# Patient Record
Sex: MALE | Race: WHITE | NOT HISPANIC OR LATINO
[De-identification: names, ages, dates, MRNs, and addresses within clinical notes are randomized per-mention and may not be internally consistent; named-entity substitution may affect disease eponyms.]

---

## 2019-05-21 PROBLEM — Z00.00 ENCOUNTER FOR PREVENTIVE HEALTH EXAMINATION: Status: ACTIVE | Noted: 2019-05-21

## 2019-06-03 ENCOUNTER — FORM ENCOUNTER (OUTPATIENT)
Age: 74
End: 2019-06-03

## 2019-06-03 VITALS — HEIGHT: 69 IN | BODY MASS INDEX: 35.55 KG/M2 | WEIGHT: 240 LBS

## 2019-06-03 DIAGNOSIS — Z87.39 PERSONAL HISTORY OF OTHER DISEASES OF THE MUSCULOSKELETAL SYSTEM AND CONNECTIVE TISSUE: ICD-10-CM

## 2019-06-03 DIAGNOSIS — Z86.79 PERSONAL HISTORY OF OTHER DISEASES OF THE CIRCULATORY SYSTEM: ICD-10-CM

## 2019-06-03 DIAGNOSIS — Z80.3 FAMILY HISTORY OF MALIGNANT NEOPLASM OF BREAST: ICD-10-CM

## 2019-06-03 DIAGNOSIS — R59.0 LOCALIZED ENLARGED LYMPH NODES: ICD-10-CM

## 2019-06-03 DIAGNOSIS — Z86.39 PERSONAL HISTORY OF OTHER ENDOCRINE, NUTRITIONAL AND METABOLIC DISEASE: ICD-10-CM

## 2019-06-03 DIAGNOSIS — I10 ESSENTIAL (PRIMARY) HYPERTENSION: ICD-10-CM

## 2019-06-03 DIAGNOSIS — Z87.891 PERSONAL HISTORY OF NICOTINE DEPENDENCE: ICD-10-CM

## 2019-06-03 RX ORDER — ATENOLOL 50 MG/1
50 TABLET ORAL
Refills: 0 | Status: ACTIVE | COMMUNITY

## 2019-06-03 RX ORDER — UBIDECARENONE 30 MG
1200 CAPSULE ORAL
Refills: 0 | Status: ACTIVE | COMMUNITY

## 2019-06-03 RX ORDER — UBIDECARENONE 100 MG
100 CAPSULE ORAL
Refills: 0 | Status: ACTIVE | COMMUNITY

## 2019-06-03 RX ORDER — CLOPIDOGREL 75 MG/1
75 TABLET, FILM COATED ORAL
Refills: 0 | Status: ACTIVE | COMMUNITY

## 2019-06-03 RX ORDER — ASPIRIN ENTERIC COATED TABLETS 81 MG 81 MG/1
81 TABLET, DELAYED RELEASE ORAL
Refills: 0 | Status: ACTIVE | COMMUNITY

## 2019-06-03 RX ORDER — MULTIVITAMIN
TABLET ORAL
Refills: 0 | Status: ACTIVE | COMMUNITY

## 2019-06-03 RX ORDER — LIRAGLUTIDE 6 MG/ML
18 INJECTION SUBCUTANEOUS
Refills: 0 | Status: ACTIVE | COMMUNITY

## 2019-06-03 RX ORDER — OMEGA-3/DHA/EPA/FISH OIL 300-1000MG
CAPSULE ORAL
Refills: 0 | Status: ACTIVE | COMMUNITY

## 2019-06-03 RX ORDER — CANAGLIFLOZIN 100 MG/1
100 TABLET, FILM COATED ORAL
Refills: 0 | Status: ACTIVE | COMMUNITY

## 2019-06-03 RX ORDER — CHOLECALCIFEROL (VITAMIN D3) 50 MCG
2000 CAPSULE ORAL
Refills: 0 | Status: ACTIVE | COMMUNITY

## 2019-06-03 RX ORDER — LISINOPRIL 10 MG/1
10 TABLET ORAL
Refills: 0 | Status: ACTIVE | COMMUNITY

## 2019-06-03 RX ORDER — FEBUXOSTAT 40 MG/1
40 TABLET ORAL
Refills: 0 | Status: ACTIVE | COMMUNITY

## 2019-06-03 RX ORDER — METFORMIN HYDROCHLORIDE 500 MG/1
500 TABLET, COATED ORAL
Refills: 0 | Status: ACTIVE | COMMUNITY

## 2019-06-04 ENCOUNTER — OTHER (OUTPATIENT)
Age: 74
End: 2019-06-04

## 2019-06-04 ENCOUNTER — APPOINTMENT (OUTPATIENT)
Dept: THORACIC SURGERY | Facility: CLINIC | Age: 74
End: 2019-06-04

## 2019-06-04 ENCOUNTER — OUTPATIENT (OUTPATIENT)
Dept: OUTPATIENT SERVICES | Facility: HOSPITAL | Age: 74
LOS: 1 days | End: 2019-06-04
Payer: MEDICARE

## 2019-06-04 VITALS
DIASTOLIC BLOOD PRESSURE: 76 MMHG | TEMPERATURE: 98 F | SYSTOLIC BLOOD PRESSURE: 162 MMHG | HEART RATE: 61 BPM | RESPIRATION RATE: 20 BRPM | WEIGHT: 257.94 LBS | HEIGHT: 69 IN

## 2019-06-04 DIAGNOSIS — I65.22 OCCLUSION AND STENOSIS OF LEFT CAROTID ARTERY: ICD-10-CM

## 2019-06-04 DIAGNOSIS — I25.10 ATHEROSCLEROTIC HEART DISEASE OF NATIVE CORONARY ARTERY WITHOUT ANGINA PECTORIS: Chronic | ICD-10-CM

## 2019-06-04 DIAGNOSIS — E11.9 TYPE 2 DIABETES MELLITUS WITHOUT COMPLICATIONS: ICD-10-CM

## 2019-06-04 DIAGNOSIS — Z98.890 OTHER SPECIFIED POSTPROCEDURAL STATES: Chronic | ICD-10-CM

## 2019-06-04 DIAGNOSIS — I10 ESSENTIAL (PRIMARY) HYPERTENSION: ICD-10-CM

## 2019-06-04 DIAGNOSIS — R59.0 LOCALIZED ENLARGED LYMPH NODES: ICD-10-CM

## 2019-06-04 DIAGNOSIS — I25.10 ATHEROSCLEROTIC HEART DISEASE OF NATIVE CORONARY ARTERY WITHOUT ANGINA PECTORIS: ICD-10-CM

## 2019-06-04 DIAGNOSIS — Z90.89 ACQUIRED ABSENCE OF OTHER ORGANS: Chronic | ICD-10-CM

## 2019-06-04 DIAGNOSIS — Z29.9 ENCOUNTER FOR PROPHYLACTIC MEASURES, UNSPECIFIED: ICD-10-CM

## 2019-06-04 DIAGNOSIS — I65.29 OCCLUSION AND STENOSIS OF UNSPECIFIED CAROTID ARTERY: ICD-10-CM

## 2019-06-04 DIAGNOSIS — Z01.818 ENCOUNTER FOR OTHER PREPROCEDURAL EXAMINATION: ICD-10-CM

## 2019-06-04 DIAGNOSIS — N40.0 BENIGN PROSTATIC HYPERPLASIA WITHOUT LOWER URINARY TRACT SYMPTOMS: ICD-10-CM

## 2019-06-04 DIAGNOSIS — M10.9 GOUT, UNSPECIFIED: ICD-10-CM

## 2019-06-04 LAB
ANION GAP SERPL CALC-SCNC: 11 MMOL/L — SIGNIFICANT CHANGE UP (ref 5–17)
BASOPHILS # BLD AUTO: 0 K/UL — SIGNIFICANT CHANGE UP (ref 0–0.2)
BASOPHILS NFR BLD AUTO: 0.5 % — SIGNIFICANT CHANGE UP (ref 0–2)
BUN SERPL-MCNC: 23 MG/DL — HIGH (ref 8–20)
CALCIUM SERPL-MCNC: 10.9 MG/DL — HIGH (ref 8.6–10.2)
CHLORIDE SERPL-SCNC: 103 MMOL/L — SIGNIFICANT CHANGE UP (ref 98–107)
CO2 SERPL-SCNC: 25 MMOL/L — SIGNIFICANT CHANGE UP (ref 22–29)
CREAT SERPL-MCNC: 1.01 MG/DL — SIGNIFICANT CHANGE UP (ref 0.5–1.3)
EOSINOPHIL # BLD AUTO: 0.4 K/UL — SIGNIFICANT CHANGE UP (ref 0–0.5)
EOSINOPHIL NFR BLD AUTO: 5.7 % — HIGH (ref 0–5)
GLUCOSE SERPL-MCNC: 127 MG/DL — HIGH (ref 70–115)
HBA1C BLD-MCNC: 6.7 % — HIGH (ref 4–5.6)
HCT VFR BLD CALC: 45.7 % — SIGNIFICANT CHANGE UP (ref 42–52)
HGB BLD-MCNC: 14.9 G/DL — SIGNIFICANT CHANGE UP (ref 14–18)
LYMPHOCYTES # BLD AUTO: 2.4 K/UL — SIGNIFICANT CHANGE UP (ref 1–4.8)
LYMPHOCYTES # BLD AUTO: 31.6 % — SIGNIFICANT CHANGE UP (ref 20–55)
MCHC RBC-ENTMCNC: 29.3 PG — SIGNIFICANT CHANGE UP (ref 27–31)
MCHC RBC-ENTMCNC: 32.6 G/DL — SIGNIFICANT CHANGE UP (ref 32–36)
MCV RBC AUTO: 90 FL — SIGNIFICANT CHANGE UP (ref 80–94)
MONOCYTES # BLD AUTO: 0.8 K/UL — SIGNIFICANT CHANGE UP (ref 0–0.8)
MONOCYTES NFR BLD AUTO: 10.6 % — HIGH (ref 3–10)
NEUTROPHILS # BLD AUTO: 3.9 K/UL — SIGNIFICANT CHANGE UP (ref 1.8–8)
NEUTROPHILS NFR BLD AUTO: 51.2 % — SIGNIFICANT CHANGE UP (ref 37–73)
PLATELET # BLD AUTO: 185 K/UL — SIGNIFICANT CHANGE UP (ref 150–400)
POTASSIUM SERPL-MCNC: 5.2 MMOL/L — SIGNIFICANT CHANGE UP (ref 3.5–5.3)
POTASSIUM SERPL-SCNC: 5.2 MMOL/L — SIGNIFICANT CHANGE UP (ref 3.5–5.3)
RBC # BLD: 5.08 M/UL — SIGNIFICANT CHANGE UP (ref 4.6–6.2)
RBC # FLD: 13.6 % — SIGNIFICANT CHANGE UP (ref 11–15.6)
SODIUM SERPL-SCNC: 139 MMOL/L — SIGNIFICANT CHANGE UP (ref 135–145)
WBC # BLD: 7.5 K/UL — SIGNIFICANT CHANGE UP (ref 4.8–10.8)
WBC # FLD AUTO: 7.5 K/UL — SIGNIFICANT CHANGE UP (ref 4.8–10.8)

## 2019-06-04 PROCEDURE — G0463: CPT

## 2019-06-04 PROCEDURE — 70498 CT ANGIOGRAPHY NECK: CPT | Mod: 26

## 2019-06-04 PROCEDURE — 93005 ELECTROCARDIOGRAM TRACING: CPT

## 2019-06-04 PROCEDURE — 70498 CT ANGIOGRAPHY NECK: CPT

## 2019-06-04 PROCEDURE — 85027 COMPLETE CBC AUTOMATED: CPT

## 2019-06-04 PROCEDURE — 93010 ELECTROCARDIOGRAM REPORT: CPT

## 2019-06-04 PROCEDURE — 36415 COLL VENOUS BLD VENIPUNCTURE: CPT

## 2019-06-04 PROCEDURE — 80048 BASIC METABOLIC PNL TOTAL CA: CPT

## 2019-06-04 PROCEDURE — 99205 OFFICE O/P NEW HI 60 MIN: CPT

## 2019-06-04 PROCEDURE — 83036 HEMOGLOBIN GLYCOSYLATED A1C: CPT

## 2019-06-04 RX ORDER — ATORVASTATIN CALCIUM 80 MG/1
1 TABLET, FILM COATED ORAL
Qty: 30 | Refills: 0
Start: 2019-06-04

## 2019-06-04 RX ORDER — LIRAGLUTIDE 6 MG/ML
0 INJECTION SUBCUTANEOUS
Qty: 0 | Refills: 0 | DISCHARGE

## 2019-06-04 NOTE — CONSULT NOTE ADULT - SUBJECTIVE AND OBJECTIVE BOX
PMD : From [place]   Cardio :  From [place]  Renard D[facility]     Patient is a 73y old  Male who presented today to presurgical testing for clearance for planned mediastinoscopy planned to be done on June 25th 2019 by Dr Blas . Patient was cleared by out of state ( his PMD / Cardio ) , called to medically clear patient for planned procedure as patient has  incidental findings of enlarged lymph nodes.     CC: Abnormal CT , incidental findings of lymphadenopathy   HPI:  This is a 73 year old male,   accompanied by his daughter ( Ms Christina Manzo ) with h/o HTN, DM type II, carotid stenosis - recently found on CT , CAD s/p 4 stents ( 2 stents done 2000 and last 2 done 2006)  , gout.  Patient state in the end of march, he had a episode of gross hematuria and urine retention.   He was sent to a urology had cystoscopy and then on march 29/19 had a  CT abd and pelvis revealed - right renal cyst, diffuse prostatomegaly, punctate nonobstructive right nephrolithiasis, no ureteral obstructing.  Superior mesenteric root stranding with lymphoadenopathy, the largest hawa mass approx 3.2 x4.7 cm .    5/1/19 CT chest -right paratracheal lymphadenopathy malignancy not excluded. Indeterminate 6 mm sclerotic lesion posterior right 11th rib. Mild cardiomegaly with coronary artery calcifications.  CT Neck -right paratracheal pathological enlarged lymph nodes. high grade proximal left ICA stenosis. 5/7/19 PET CT- increased uptake in mediastinal and abdominal adenopathy with SUVs in the 5-6 range consistent with neoplasm. This is likely lymphoma given the distribution. none of these lesions is amenable to image guided percutaneous biopsy.   Patient state hematuria and urinary retention have since resolved. Denies SOB, CP, fatigue or abd pain . He is now schedule for flexible bronchoscopy, mediastinoscopy. (04 Jun 2019 13:21)  CT - chest / neck / abdomen - reports brought by daughter - reviewed by me .       PAST MEDICAL & SURGICAL HISTORY:  Carotid stenosis  Kidney stones  CAD, multiple vessel  Enlarged prostate  Hypertension  Diabetes type 2   Gout  S/P Mohs surgery for basal cell carcinoma: nose and forehead  H/O right wrist surgery: ex fix right wrist  H/O cystoscopy  CAD S/P percutaneous coronary angioplasty: 4 stents, 2000, 2006  S/P tonsillectomy      Social History:  Tobacco - ex smoker , smoked 2 PPD for about 30 yrs , quit 15 yrs ago   ETOH - ex heavy alcohol user , quit 30 yrs ago   Illicit drug abuse - denies   , retired     FAMILY HISTORY:  FH: heart disease (Sibling)  FHx: colon cancer (Sibling)  FH: breast cancer (Mother)  FHx: hypertension (Sibling, Mother)  FHx: diabetes mellitus      Allergies    penicillins (Anaphylaxis)    Intolerances    HOME MEDICATIONS :     · 	Invokana 100 mg oral tablet: Last Dose Taken:  , 1 tab(s) orally once a day  · 	metFORMIN 1000 mg oral tablet: Last Dose Taken:  , 1 tab(s) orally 2 times a day  · 	Plavix 75 mg oral tablet: Last Dose Taken:  , 1 tab(s) orally once a day  · 	Uloric 40 mg oral tablet: Last Dose Taken:  , 1 tab(s) orally once a day  · 	atenolol 50 mg oral tablet: Last Dose Taken:  , 1 tab(s) orally once a day  · 	lisinopril 10 mg oral tablet: Last Dose Taken:  , 1 tab(s) orally once a day  · 	Flomax 0.4 mg oral capsule: Last Dose Taken:  , 1 cap(s) orally once a day  · 	Flavio Low Dose 81 mg oral delayed release tablet: Last Dose Taken:  , 1 tab(s) orally once a day  · 	Victoza 18 mg/3 mL subcutaneous solution: 1.8  subcutaneous once a day    REVIEW OF SYSTEMS:  All systems are reviewed and are negative   MEDICATIONS  (STANDING):    MEDICATIONS  (PRN):      Vital Signs Last 24 Hrs  T(C): 36.8 (04 Jun 2019 13:21), Max: 36.8 (04 Jun 2019 13:21)  T(F): 98.3 (04 Jun 2019 13:21), Max: 98.3 (04 Jun 2019 13:21)  HR: 61 (04 Jun 2019 13:21) (61 - 61)  BP: 162/76 (04 Jun 2019 13:21) (162/76 - 162/76)  BP(mean): 104 (04 Jun 2019 13:21) (104 - 104)  RR: 20 (04 Jun 2019 13:21) (20 - 20)  SpO2: --    PHYSICAL EXAM:    GENERAL: NAD, well-groomed, obese   HEAD:  Atraumatic, Normocephalic  EYES: EOMI, PERRLA, conjunctiva and sclera clear  NECK: Supple, No JVD, Normal thyroid  NERVOUS SYSTEM:  Alert & Oriented X3, Good concentration; Motor Strength 5/5 B/L upper and lower extremities; DTRs 2+ intact and symmetric  CHEST/LUNG: CTA  b/l,  no rales, rhonchi, wheezing, or rubs  HEART: Regular rate and rhythm; No murmurs, rubs, or gallops  ABDOMEN: Soft, Nontender, Nondistended; Bowel sounds present, obese , ventral hernia +   EXTREMITIES:  2+ Peripheral Pulses, No clubbing, cyanosis, or edema ,   LYMPH: No lymphadenopathy noted  SKIN: No rashes or lesions    LABS:                        14.9   7.5   )-----------( 185      ( 04 Jun 2019 13:40 )             45.7     06-04    139  |  103  |  23.0<H>  ----------------------------<  127<H>  5.2   |  25.0  |  1.01    Ca    10.9<H>      04 Jun 2019 13:40    Hemoglobin A1C, Whole Blood (06.04.19 @ 13:40)    Hemoglobin A1C, Whole Blood: 6.7 %        < from: 12 Lead ECG (06.04.19 @ 13:27) >    Ventricular Rate 62 BPM    Atrial Rate 62 BPM    P-R Interval 172 ms    QRS Duration 94 ms    Q-T Interval 406 ms    QTC Calculation(Bezet) 412 ms    P Axis -7 degrees    R Axis -45 degrees    T Axis 0 degrees    Diagnosis Line Normal sinus rhythm  Left anterior fascicular block  Abnormal ECG    Confirmed by Kennedy Nova ([id number]) on 6/4/2019 4:38:58 PM    < end of copied text >    EKG ( 3/16/17 ) Sinus rhythm with L AFB   RADIOLOGY & ADDITIONAL STUDIES: PMD : From [place]   Cardio :  From [place]  [facility]     Patient is a 73y old  Male who presented today to presurgical testing for clearance for planned mediastinoscopy planned to be done on June 25th 2019 by Dr Blas . Patient was cleared by out of state ( his PMD / Cardio ) , called to medically clear patient for planned procedure as patient has  incidental findings of enlarged lymph nodes.     CC: Abnormal CT , incidental findings of lymphadenopathy   HPI:  This is a 73 year old male,   accompanied by his daughter ( Ms Christina Manzo ) with h/o HTN, DM type II, carotid stenosis - recently found on CT , CAD s/p 4 stents ( 2 stents done 2000 and last 2 done 2006)  , gout.  Patient state in the end of march, he had a episode of gross hematuria and urine retention.   He was sent to a urology had cystoscopy and then on march 29/19 had a  CT abd and pelvis revealed - right renal cyst, diffuse prostatomegaly, punctate nonobstructive right nephrolithiasis, no ureteral obstructing.  Superior mesenteric root stranding with lymphoadenopathy, the largest hawa mass approx 3.2 x4.7 cm .    5/1/19 CT chest -right paratracheal lymphadenopathy malignancy not excluded. Indeterminate 6 mm sclerotic lesion posterior right 11th rib. Mild cardiomegaly with coronary artery calcifications.  CT Neck -right paratracheal pathological enlarged lymph nodes. high grade proximal left ICA stenosis. 5/7/19 PET CT- increased uptake in mediastinal and abdominal adenopathy with SUVs in the 5-6 range consistent with neoplasm. This is likely lymphoma given the distribution. none of these lesions is amenable to image guided percutaneous biopsy.   Patient state hematuria and urinary retention have since resolved. Denies SOB, CP, fatigue or abd pain . He is now schedule for flexible bronchoscopy, mediastinoscopy. (04 Jun 2019 13:21)  CT - chest / neck / abdomen - reports brought by daughter - reviewed by me .       PAST MEDICAL & SURGICAL HISTORY:  Carotid stenosis  Kidney stones  CAD, multiple vessel  Enlarged prostate  Hypertension  Diabetes type 2   Gout  S/P Mohs surgery for basal cell carcinoma: nose and forehead  H/O right wrist surgery: ex fix right wrist  H/O cystoscopy  CAD S/P percutaneous coronary angioplasty: 4 stents, 2000, 2006  S/P tonsillectomy      Social History:  Tobacco - ex smoker , smoked 2 PPD for about 30 yrs , quit 15 yrs ago   ETOH - ex heavy alcohol user , quit 30 yrs ago   Illicit drug abuse - denies   , retired     FAMILY HISTORY:  FH: heart disease (Sibling)  FHx: colon cancer (Sibling)  FH: breast cancer (Mother)  FHx: hypertension (Sibling, Mother)  FHx: diabetes mellitus      Allergies    penicillins (Anaphylaxis)    Intolerances    HOME MEDICATIONS :     · 	Invokana 100 mg oral tablet: Last Dose Taken:  , 1 tab(s) orally once a day  · 	metFORMIN 1000 mg oral tablet: Last Dose Taken:  , 1 tab(s) orally 2 times a day  · 	Plavix 75 mg oral tablet: Last Dose Taken:  , 1 tab(s) orally once a day  · 	Uloric 40 mg oral tablet: Last Dose Taken:  , 1 tab(s) orally once a day  · 	atenolol 50 mg oral tablet: Last Dose Taken:  , 1 tab(s) orally once a day  · 	lisinopril 10 mg oral tablet: Last Dose Taken:  , 1 tab(s) orally once a day  · 	Flomax 0.4 mg oral capsule: Last Dose Taken:  , 1 cap(s) orally once a day  · 	Flavio Low Dose 81 mg oral delayed release tablet: Last Dose Taken:  , 1 tab(s) orally once a day  · 	Victoza 18 mg/3 mL subcutaneous solution: 1.8  subcutaneous once a day    REVIEW OF SYSTEMS:  All systems are reviewed and are negative   MEDICATIONS  (STANDING):    MEDICATIONS  (PRN):      Vital Signs Last 24 Hrs  T(C): 36.8 (04 Jun 2019 13:21), Max: 36.8 (04 Jun 2019 13:21)  T(F): 98.3 (04 Jun 2019 13:21), Max: 98.3 (04 Jun 2019 13:21)  HR: 61 (04 Jun 2019 13:21) (61 - 61)  BP: 162/76 (04 Jun 2019 13:21) (162/76 - 162/76)  BP(mean): 104 (04 Jun 2019 13:21) (104 - 104)  RR: 20 (04 Jun 2019 13:21) (20 - 20)  SpO2: --    PHYSICAL EXAM:    GENERAL: NAD, well-groomed, obese   HEAD:  Atraumatic, Normocephalic  EYES: EOMI, PERRLA, conjunctiva and sclera clear  NECK: Supple, No JVD, Normal thyroid  NERVOUS SYSTEM:  Alert & Oriented X3, Good concentration; Motor Strength 5/5 B/L upper and lower extremities; DTRs 2+ intact and symmetric  CHEST/LUNG: CTA  b/l,  no rales, rhonchi, wheezing, or rubs  HEART: Regular rate and rhythm; No murmurs, rubs, or gallops  ABDOMEN: Soft, Nontender, Nondistended; Bowel sounds present, obese , ventral hernia +   EXTREMITIES:  2+ Peripheral Pulses, No clubbing, cyanosis, or edema ,   LYMPH: No lymphadenopathy noted  SKIN: No rashes or lesions    LABS:                        14.9   7.5   )-----------( 185      ( 04 Jun 2019 13:40 )             45.7     06-04    139  |  103  |  23.0<H>  ----------------------------<  127<H>  5.2   |  25.0  |  1.01    Ca    10.9<H>      04 Jun 2019 13:40    Hemoglobin A1C, Whole Blood (06.04.19 @ 13:40)    Hemoglobin A1C, Whole Blood: 6.7 %        < from: 12 Lead ECG (06.04.19 @ 13:27) >    Ventricular Rate 62 BPM    Atrial Rate 62 BPM    P-R Interval 172 ms    QRS Duration 94 ms    Q-T Interval 406 ms    QTC Calculation(Bezet) 412 ms    P Axis -7 degrees    R Axis -45 degrees    T Axis 0 degrees    Diagnosis Line Normal sinus rhythm  Left anterior fascicular block  Abnormal ECG    Confirmed by Kennedy Nova ([id number]) on 6/4/2019 4:38:58 PM    < end of copied text >    EKG ( 3/16/17 ) Sinus rhythm with L AFB   RADIOLOGY & ADDITIONAL STUDIES:    < from: CT Angio Neck w/ IV Cont (06.04.19 @ 16:30) >   EXAM:  CT ANGIO NECK (W)AW IC                          PROCEDURE DATE:  06/04/2019          INTERPRETATION:      REASON FOR EXAM:   73-year-old man assess for carotid stenosis/occlusion    TECHNIQUE: Following administration of 95 mL of Omnipaque 350 contrast   material, CT angiography of the neck was performed with multiplanar, MIP   and 3D reformations.    COMPARISON: comp.      FINDINGS:  There is scattered calcification of aortic arch and at the origin of the   great vessels without hemodynamically significant stenosis.     Evaluation of the right carotid artery demonstrate a tortuous right   common carotid artery. There are calcified and noncalcified plaque at the   origin of the right internal carotid artery resulting in focal   nonhemodynamically significant narrowing at its origin however, in the   proximal right cervical internal carotid artery, there is soft and   calcified plaque resulting in focal high-grade stenosis of approximately   70-75 % .  The right cervical internalcarotid artery is tortuous but   otherwise unremarkable. The visualized intracranial right internal   carotid artery is intact. The right external carotid arteries within   normal limits. There is scattered atheromatous changes of the visualized   intracranial portion of right internal carotid artery.    Evaluation of the left carotid system demonstrates slightly tortuous left   common carotid artery. There is scattered calcified and noncalcified   plaque at the origin of the left internal carotid artery resulting in   stenosis of 55-65%. There is tortuous but patent left cervical internal   carotid artery without significant stenosis. There is normal left   external carotid artery.    There is normal opacification and caliber of the vertebral arteries are   patent.    The thyroid gland is slightly heterogenous. There are no gross masses in   the neck. Brain parenchyma is unremarkable.      IMPRESSION:    Focal calcified and noncalcified atheromatous plaque approximately 1 cm   above the right common carotid artery bifurcation at the proximal right   cervical internal carotid artery resulting in focal stenosis of 70-75%.    Atheromatous changes origin left internal carotid artery with narrowing   of 55-65%        GABRIELLA SHETTY M.D., ATTENDING RADIOLOGIST  This document has been electronically signed. Jun 4 2019  4:47PM        < end of copied text >

## 2019-06-04 NOTE — CONSULT NOTE ADULT - PROBLEM SELECTOR RECOMMENDATION 3
- recent cardiology w/u done , seen by Dr Freitas today and cleared , as per cardio hold Plavix for 5 days prior OR , continue ASA.   Stopped statins in the past , Lipitor 20 mg daily recommended .

## 2019-06-04 NOTE — CONSULT NOTE ADULT - ASSESSMENT
This is a 73 year old male  accompanied by his daughter ( Ms Christina Manzo ) with h/o HTN, DM type II, carotid stenosis - recently found on CT , CAD s/p 4 stents ( 2 stents done 2000 and last 2 done 2006 , gout.  Patient state in the end of march, he had a episode of gross hematuria and urine retention.   He was sent to a urology had cystoscopy and then on march 29/19 had a  CT abd and pelvis revealed - right renal cyst, diffuse prostatomegaly, punctate nonobstructive right nephrolithiasis, no ureteral obstructing.  Superior mesenteric root stranding with lymphoadenopathy, the largest hawa mass approx 3.2 x4.7 cm .    5/1/19 CT chest -right paratracheal lymphadenopathy malignancy not excluded. Indeterminate 6 mm sclerotic lesion posterior right 11th rib. Mild cardiomegaly with coronary artery calcifications.  CT Neck -right paratracheal pathological enlarged lymph nodes. high grade proximal left ICA stenosis. 5/7/19 PET CT- increased uptake in mediastinal and abdominal adenopathy with SUVs in the 5-6 range consistent with neoplasm. This is likely lymphoma given the distribution. none of these lesions is amenable to image guided percutaneous biopsy.   Patient state hematuria and urinary retention have since resolved. Denies SOB, CP, fatigue or abd pain . He is now schedule for flexible bronchoscopy, mediastinoscopy. (04 Jun 2019 13:21)    LVEF post stress is 55%. He has mild mitral and tricuspid valve regurgitation.  1. We will obtain CTA of neck for left carotid stenosis  2. Can hold plavix for 5 days prior to surgery  3. Continue with aspirin as before  4. His BP has been normal at home; high today. I will not change his meds. He will keep tract of BP readings at home  5. Start lipitor 20 mg daily due to CAD.  6. As long as he does not have severe aortic stenosis, he may proceed with surgery from our stand point. This is a 73 year old male  accompanied by his daughter ( Ms Christina Manzo ) with h/o HTN, DM type II, carotid stenosis - recently found on CT , CAD s/p 4 stents ( 2 stents done 2000 and last 2 done 2006 , gout.  Patient state in the end of march, he had a episode of gross hematuria and urine retention.   He was sent to a urology had cystoscopy and then on march 29/19 had a  CT abd and pelvis revealed - right renal cyst, diffuse prostatomegaly, punctate nonobstructive right nephrolithiasis, no ureteral obstructing.  Superior mesenteric root stranding with lymphoadenopathy, the largest hawa mass approx 3.2 x4.7 cm .    5/1/19 CT chest -right paratracheal lymphadenopathy malignancy not excluded. Indeterminate 6 mm sclerotic lesion posterior right 11th rib. Mild cardiomegaly with coronary artery calcifications.  CT Neck -right paratracheal pathological enlarged lymph nodes. high grade proximal left ICA stenosis. 5/7/19 PET CT- increased uptake in mediastinal and abdominal adenopathy with SUVs in the 5-6 range consistent with neoplasm. This is likely lymphoma given the distribution. none of these lesions is amenable to image guided percutaneous biopsy.   Patient state hematuria and urinary retention have since resolved. Denies SOB, CP, fatigue or abd pain . He is now schedule for flexible bronchoscopy, mediastinoscopy. (04 Jun 2019 13:21)

## 2019-06-04 NOTE — CONSULT NOTE ADULT - SUBJECTIVE AND OBJECTIVE BOX
Patient is a 73y old  Male who presents with a chief complaint of abnormal CT findings.    HPI:  Patient lives in [place]. He is here to have lymph node biopsy by Dr. Blas.   He has a history of hypertension, hyperlipidemia and diabetes. History of CAD with 4 stents last of which was in 2006. (7/13/2000 with Taxus to Lcx and 7/2006 with PCI to RCA).  He has had stress tests by his cardiologist, last one was last week and reportedly normal.  He walks daily and does house work. He has no chest pain or dyspnea with exertion. he denies any history of MI or CVA, CHF or renal insufficiency.   He has gross hematuria and urine retention started end of march. CT abd and pelvis revealed - punctate nonobstructive right nephrolithiasis, enlarged mesenteric lymph nodes noted on abd pelvis CT, enlarged mediastinal lymph nodes   5/1/19 CT chest -right paratracheal lymphadenopathy malignancy not excluded. Indeterminate 6 mm sclerotic lesion posterior right 11th rib. Mild cardiomegaly with coronary artery calcifications.  CT Neck right paratracheal pathological enlarged lymph nodes,  high grade proximal left ICA stenosis   5/1/19 superior mesenteric root lymphadenopathy largest hawa mass 3.2 x 4.7 cm.    PAST MEDICAL & SURGICAL HISTORY:  Carotid stenosis  Kidney stones  CAD, multiple vessel  Enlarged prostate  Hypertension  Diabetes  Gout  H/O cystoscopy  CAD S/P percutaneous coronary angioplasty: 4 stents, 2000, 2006  S/P tonsillectomy    Allergies  penicillins (Anaphylaxis)    MEDICATIONS  (STANDING):  INvokana 100 mg daily  Metformin 1000 bid  plavix 75 mg daily  Uloric 40 mg   Atenolol 50 mg  Lisinopril 10 mg daily  Victoza 1.8 sq daily  Flomax 0.4 mg daily  MEDICATIONS  (PRN):    FAMILY HISTORY:  FHx: colon cancer (Sibling)  FH: breast cancer (Mother)  FHx: hypertension (Sibling, Mother)  FHx: diabetes mellitus  FHx: lung cancer (Mother)  FHx: bladder cancer (Sibling)      SOCIAL HISTORY: Former smoker, quit 15 years ago. no ETOH or drug use.     REVIEW OF SYSTEMS:  CONSTITUTIONAL: No fever, weight loss, or fatigue  EYES: No eye pain, visual disturbances, or discharge  ENMT:  No difficulty hearing, tinnitus, vertigo; No sinus or throat pain  NECK: No pain or stiffness  RESPIRATORY: No cough, wheezing, chills or hemoptysis; No Shortness of Breath  CARDIOVASCULAR: No chest pain, palpitations, passing out, dizziness, or leg swelling  GASTROINTESTINAL: No abdominal or epigastric pain. No nausea, vomiting, or hematemesis; No diarrhea or constipation. No melena or hematochezia.  GENITOURINARY: See HPI  NEUROLOGICAL: No headaches, memory loss, loss of strength, numbness, or tremors  SKIN: No itching, burning, rashes, or lesions   LYMPH Nodes: No enlarged glands  ENDOCRINE: No heat or cold intolerance; No hair loss  MUSCULOSKELETAL: No joint pain or swelling; No muscle, back, or extremity pain  PSYCHIATRIC: No depression, anxiety, mood swings, or difficulty sleeping  HEME/LYMPH: No easy bruising, or bleeding gums  ALLERY AND IMMUNOLOGIC: No hives or eczema	    Vital Signs Last 24 Hrs  /90  Hr 62  RR 18  Pox 96% RA  No pain  Afebrile   PHYSICAL EXAM:  Appearance: Normal, well nourished	  HEENT:   Normal oral mucosa, PERRL, EOMI, sclera non-icteric	  Cardiovascular: Normal S1 S2, No JVD, 3/6 SM lsb, left carotid bruit   Respiratory: Lungs clear to auscultation	  Psychiatry: A & O x 3, Mood & affect appropriate  Gastrointestinal:  Soft, Non-tender, + BS, no bruits	  Skin: No rashes, No ecchymoses, No cyanosis  Neurologic: Grossly non-focal with full strength in all four extremities  Extremities: Normal range of motion, No clubbing, cyanosis or edema  Vascular: Peripheral pulses palpable 2+ bilaterally    ECG: NSR, 62 BPM, LAFB.     LABS:                        14.9   7.5   )-----------( 185      ( 04 Jun 2019 13:40 )             45.7     06-04    139  |  103  |  23.0<H>  ----------------------------<  127<H>  5.2   |  25.0  |  1.01    Ca    10.9<H>      04 Jun 2019 13:40

## 2019-06-04 NOTE — H&P PST ADULT - NSANTHOSAYNRD_GEN_A_CORE
No. KRISTAN screening performed.  STOP BANG Legend: 0-2 = LOW Risk; 3-4 = INTERMEDIATE Risk; 5-8 = HIGH Risk

## 2019-06-04 NOTE — H&P PST ADULT - NEGATIVE CARDIOVASCULAR SYMPTOMS
no chest pain/no paroxysmal nocturnal dyspnea/no dyspnea on exertion/no orthopnea/no palpitations/no claudication

## 2019-06-04 NOTE — H&P PST ADULT - NSICDXPASTSURGICALHX_GEN_ALL_CORE_FT
PAST SURGICAL HISTORY:  CAD S/P percutaneous coronary angioplasty 4 stents, 2000, 2006    H/O cystoscopy     S/P tonsillectomy PAST SURGICAL HISTORY:  CAD S/P percutaneous coronary angioplasty 4 stents, 2000, 2006    H/O cystoscopy     H/O right wrist surgery ex fix right wrist    S/P Mohs surgery for basal cell carcinoma nose and forehead    S/P tonsillectomy

## 2019-06-04 NOTE — H&P PST ADULT - ASSESSMENT
73 year old male with h/o HTN, DM type II, carotid stenosis, CAD s/p 4 stents, gout and enlarge prostate present to PST with c/o enlarged lymph nodes noted on CT.  He is now schedule for flexible bronchoscopy, mediastinoscopy.   CAPRINI VTE 2.0 SCORE [CLOT updated 2019]    AGE RELATED RISK FACTORS                                                       MOBILITY RELATED FACTORS  [ ] Age 41-60 years                                            (1 Point)                    [ ] Bed rest                                                        (1 Point)  [x ] Age: 61-74 years                                           (2 Points)                  [ ] Plaster cast                                                   (2 Points)  [ ] Age= 75 years                                              (3 Points)                    [ ] Bed bound for more than 72 hours                 (2 Points)    DISEASE RELATED RISK FACTORS                                               GENDER SPECIFIC FACTORS  [x ] Edema in the lower extremities                       (1 Point)              [ ] Pregnancy                                                     (1 Point)  [x ] Varicose veins                                               (1 Point)                     [ ] Post-partum < 6 weeks                                   (1 Point)             [ x] BMI > 25 Kg/m2                                            (1 Point)                     [ ] Hormonal therapy  or oral contraception          (1 Point)                 [ ] Sepsis (in the previous month)                        (1 Point)               [ ] History of pregnancy complications                 (1 point)  [ ] Pneumonia or serious lung disease                                               [ ] Unexplained or recurrent                     (1 Point)           (in the previous month)                               (1 Point)  [ ] Abnormal pulmonary function test                     (1 Point)                 SURGERY RELATED RISK FACTORS  [ ] Acute myocardial infarction                              (1 Point)               [ ]  Section                                             (1 Point)  [ ] Congestive heart failure (in the previous month)  (1 Point)      [ x] Minor surgery                                                  (1 Point)   [ ] Inflammatory bowel disease                             (1 Point)               [ ] Arthroscopic surgery                                        (2 Points)  [ ] Central venous access                                      (2 Points)                [ ] General surgery lasting more than 45 minutes (2 points)  [ ] Malignancy- Present or previous                   (2 Points)                [ ] Elective arthroplasty                                         (5 points)    [ ] Stroke (in the previous month)                          (5 Points)                                                                                                                                                           HEMATOLOGY RELATED FACTORS                                                 TRAUMA RELATED RISK FACTORS  [ ] Prior episodes of VTE                                     (3 Points)                [ ] Fracture of the hip, pelvis, or leg                       (5 Points)  [ ] Positive family history for VTE                         (3 Points)             [ ] Acute spinal cord injury (in the previous month)  (5 Points)  [ ] Prothrombin 54697 A                                     (3 Points)               [ ] Paralysis  (less than 1 month)                             (5 Points)  [ ] Factor V Leiden                                             (3 Points)                  [ ] Multiple Trauma within 1 month                        (5 Points)  [ ] Lupus anticoagulants                                     (3 Points)                                                           [ ] Anticardiolipin antibodies                               (3 Points)                                                       [ ] High homocysteine in the blood                      (3 Points)                                             [ ] Other congenital or acquired thrombophilia      (3 Points)                                                [ ] Heparin induced thrombocytopenia                  (3 Points)                                     Total Score [   6    ]

## 2019-06-04 NOTE — H&P PST ADULT - NSICDXFAMILYHX_GEN_ALL_CORE_FT
FAMILY HISTORY:  FHx: diabetes mellitus    Mother  Still living? Unknown  FH: breast cancer, Age at diagnosis: Age Unknown  FHx: hypertension, Age at diagnosis: Age Unknown  FHx: lung cancer, Age at diagnosis: Age Unknown    Sibling  Still living? Unknown  FHx: bladder cancer, Age at diagnosis: Age Unknown  FHx: colon cancer, Age at diagnosis: Age Unknown  FHx: hypertension, Age at diagnosis: Age Unknown FAMILY HISTORY:  FHx: diabetes mellitus    Mother  Still living? Unknown  FH: breast cancer, Age at diagnosis: Age Unknown  FHx: hypertension, Age at diagnosis: Age Unknown    Sibling  Still living? Unknown  FH: heart disease, Age at diagnosis: Age Unknown  FHx: colon cancer, Age at diagnosis: Age Unknown  FHx: hypertension, Age at diagnosis: Age Unknown

## 2019-06-04 NOTE — ASU PATIENT PROFILE, ADULT - PSH
CAD S/P percutaneous coronary angioplasty  4 stents, 2000, 2006  H/O cystoscopy    H/O right wrist surgery  ex fix right wrist  S/P Mohs surgery for basal cell carcinoma  nose and forehead  S/P tonsillectomy

## 2019-06-04 NOTE — CONSULT NOTE ADULT - ATTENDING COMMENTS
Patient is medically stable for planned procedure if cleared by cardiology . Cardiology noted , d/w Dt [place] - cleared by cardiology .   Patient is medically stable for planned procedure.

## 2019-06-04 NOTE — H&P PST ADULT - LAST ECHOCARDIOGRAM
unsure but think no more than 3 years ago 4/3/17  EF 55- 60 %, mild tricuspid regurgitation, left atrium chamber is mildly dilated

## 2019-06-04 NOTE — ASU PATIENT PROFILE, ADULT - PMH
CAD, multiple vessel    Carotid stenosis    Diabetes    Enlarged prostate    Gout    Hypertension    Kidney stones

## 2019-06-04 NOTE — CONSULT NOTE ADULT - PROBLEM SELECTOR RECOMMENDATION 6
moderate to high grade stenosis  .   CTA recommended CTA done - report noted - R carotid a .  with focal stenosis 70-75% ,  L carotid narrowing 55-65 %   Continue Lipitor 20 mg , if patient tolerates may consider to increase dose .  D/W daughter Christina - informed about plan .

## 2019-06-04 NOTE — H&P PST ADULT - NSICDXPROBLEM_GEN_ALL_CORE_FT
PROBLEM DIAGNOSES  Problem: Localized enlarged lymph nodes  Assessment and Plan: flexible Bronchoscopy, Mediastinoscopy PROBLEM DIAGNOSES  Problem: CAD, multiple vessel  Assessment and Plan: cardiac clearance  hold Plavix/ ASA  7 days prior to procedure      Problem: Hypertension  Assessment and Plan: routine labs and ekg  continue medication as directed  medical clearance     Problem: Diabetes  Assessment and Plan: routine labs   continue medication as directed  medical clearance     Problem: Carotid stenosis  Assessment and Plan: CTA neck order by cardiology     Problem: Need for prophylactic measure  Assessment and Plan: moderate to high risk for VTE event, the surgical team will evaluate for appropriate VTE prophylaxis     Problem: Localized enlarged lymph nodes  Assessment and Plan: flexible Bronchoscopy, Mediastinoscopy

## 2019-06-04 NOTE — H&P PST ADULT - NSICDXPASTMEDICALHX_GEN_ALL_CORE_FT
PAST MEDICAL HISTORY:  CAD, multiple vessel     Carotid stenosis     Diabetes     Enlarged prostate     Gout     Hypertension     Kidney stones

## 2019-06-04 NOTE — CONSULT NOTE ADULT - ASSESSMENT
72 yo male with prior CAD, stents to RCA and Lcx, HTN, hyperlipidemia and diabetes with incidental finding of CT abdomen and pelvis due to hematuria. He will need biopsy of paratracheal lymph nodes.  He has stopped smoking 15 years ago. Stress test last week was negative for ischemia.  LVEF post stress is 55%. He has mild mitral and tricuspid valve regurgitation.  1. We will obtain CTA of neck for left carotid stenosis  2. Can hold plavix for 5 days prior to surgery  3. Continue with aspirin as before  4. His BP has been normal at home; high today. I will not change his meds. He will keep tract of BP readings at home  5. Start lipitor 20 mg daily due to CAD.  6. As long as he does not have severe aortic stenosis, he may proceed with surgery from our stand point. 72 yo male with prior CAD, stents to RCA and Lcx, HTN, hyperlipidemia and diabetes with incidental finding of CT abdomen and pelvis due to hematuria. He will need biopsy of paratracheal lymph nodes.  He has stopped smoking 15 years ago. Stress test last week was negative for ischemia.  LVEF post stress is 55%. He has mild mitral and tricuspid valve regurgitation.  1. We will obtain CTA of neck for left carotid stenosis  2. Can hold plavix for 5 days prior to surgery  3. Continue with aspirin as before  4. His BP has been normal at home; high today. I will not change his meds. He will keep tract of BP readings at home  5. Start lipitor 20 mg daily due to CAD.  6. As long as he does not have severe carotid stenosis, he may proceed with surgery from our stand point.

## 2019-06-07 RX ORDER — SODIUM CHLORIDE 9 MG/ML
3 INJECTION INTRAMUSCULAR; INTRAVENOUS; SUBCUTANEOUS ONCE
Refills: 0 | Status: DISCONTINUED | OUTPATIENT
Start: 2019-06-25 | End: 2019-06-25

## 2019-06-24 ENCOUNTER — TRANSCRIPTION ENCOUNTER (OUTPATIENT)
Age: 74
End: 2019-06-24

## 2019-06-25 ENCOUNTER — RESULT REVIEW (OUTPATIENT)
Age: 74
End: 2019-06-25

## 2019-06-25 ENCOUNTER — OUTPATIENT (OUTPATIENT)
Dept: OUTPATIENT SERVICES | Facility: HOSPITAL | Age: 74
LOS: 1 days | End: 2019-06-25
Payer: MEDICARE

## 2019-06-25 ENCOUNTER — APPOINTMENT (OUTPATIENT)
Dept: THORACIC SURGERY | Facility: HOSPITAL | Age: 74
End: 2019-06-25

## 2019-06-25 VITALS
HEART RATE: 76 BPM | RESPIRATION RATE: 19 BRPM | SYSTOLIC BLOOD PRESSURE: 127 MMHG | OXYGEN SATURATION: 95 % | DIASTOLIC BLOOD PRESSURE: 55 MMHG

## 2019-06-25 VITALS
HEART RATE: 64 BPM | DIASTOLIC BLOOD PRESSURE: 55 MMHG | SYSTOLIC BLOOD PRESSURE: 143 MMHG | OXYGEN SATURATION: 98 % | TEMPERATURE: 98 F | HEIGHT: 69 IN | WEIGHT: 257.94 LBS | RESPIRATION RATE: 16 BRPM

## 2019-06-25 DIAGNOSIS — R59.0 LOCALIZED ENLARGED LYMPH NODES: ICD-10-CM

## 2019-06-25 DIAGNOSIS — Z98.890 OTHER SPECIFIED POSTPROCEDURAL STATES: Chronic | ICD-10-CM

## 2019-06-25 DIAGNOSIS — Z90.89 ACQUIRED ABSENCE OF OTHER ORGANS: Chronic | ICD-10-CM

## 2019-06-25 DIAGNOSIS — Z01.818 ENCOUNTER FOR OTHER PREPROCEDURAL EXAMINATION: ICD-10-CM

## 2019-06-25 DIAGNOSIS — I25.10 ATHEROSCLEROTIC HEART DISEASE OF NATIVE CORONARY ARTERY WITHOUT ANGINA PECTORIS: Chronic | ICD-10-CM

## 2019-06-25 LAB
ABO RH CONFIRMATION: SIGNIFICANT CHANGE UP
BLD GP AB SCN SERPL QL: SIGNIFICANT CHANGE UP
GLUCOSE BLDC GLUCOMTR-MCNC: 132 MG/DL — HIGH (ref 70–99)
GLUCOSE BLDC GLUCOMTR-MCNC: 147 MG/DL — HIGH (ref 70–99)
GRAM STN FLD: SIGNIFICANT CHANGE UP
GRAM STN FLD: SIGNIFICANT CHANGE UP
SPECIMEN SOURCE: SIGNIFICANT CHANGE UP
SPECIMEN SOURCE: SIGNIFICANT CHANGE UP
TYPE + AB SCN PNL BLD: SIGNIFICANT CHANGE UP

## 2019-06-25 PROCEDURE — 87075 CULTR BACTERIA EXCEPT BLOOD: CPT

## 2019-06-25 PROCEDURE — 88305 TISSUE EXAM BY PATHOLOGIST: CPT | Mod: 26

## 2019-06-25 PROCEDURE — 31624 DX BRONCHOSCOPE/LAVAGE: CPT

## 2019-06-25 PROCEDURE — 88333 PATH CONSLTJ SURG CYTO XM 1: CPT | Mod: 26

## 2019-06-25 PROCEDURE — 88305 TISSUE EXAM BY PATHOLOGIST: CPT

## 2019-06-25 PROCEDURE — 86923 COMPATIBILITY TEST ELECTRIC: CPT

## 2019-06-25 PROCEDURE — 88333 PATH CONSLTJ SURG CYTO XM 1: CPT

## 2019-06-25 PROCEDURE — 87206 SMEAR FLUORESCENT/ACID STAI: CPT

## 2019-06-25 PROCEDURE — 86900 BLOOD TYPING SEROLOGIC ABO: CPT

## 2019-06-25 PROCEDURE — 87116 MYCOBACTERIA CULTURE: CPT

## 2019-06-25 PROCEDURE — 39401 MEDIASTINOSCPY W/MEDSTNL BX: CPT

## 2019-06-25 PROCEDURE — 86850 RBC ANTIBODY SCREEN: CPT

## 2019-06-25 PROCEDURE — 87015 SPECIMEN INFECT AGNT CONCNTJ: CPT

## 2019-06-25 PROCEDURE — 87102 FUNGUS ISOLATION CULTURE: CPT

## 2019-06-25 PROCEDURE — 82962 GLUCOSE BLOOD TEST: CPT

## 2019-06-25 PROCEDURE — 87070 CULTURE OTHR SPECIMN AEROBIC: CPT

## 2019-06-25 PROCEDURE — 86901 BLOOD TYPING SEROLOGIC RH(D): CPT

## 2019-06-25 PROCEDURE — 39402 MEDIASTINOSCPY W/LMPH NOD BX: CPT

## 2019-06-25 PROCEDURE — 36415 COLL VENOUS BLD VENIPUNCTURE: CPT

## 2019-06-25 RX ORDER — FEBUXOSTAT 40 MG/1
1 TABLET ORAL
Qty: 0 | Refills: 0 | DISCHARGE

## 2019-06-25 RX ORDER — CLOPIDOGREL BISULFATE 75 MG/1
1 TABLET, FILM COATED ORAL
Qty: 0 | Refills: 0 | DISCHARGE

## 2019-06-25 RX ORDER — LISINOPRIL 2.5 MG/1
1 TABLET ORAL
Qty: 0 | Refills: 0 | DISCHARGE

## 2019-06-25 RX ORDER — ASPIRIN/CALCIUM CARB/MAGNESIUM 324 MG
1 TABLET ORAL
Qty: 0 | Refills: 0 | DISCHARGE

## 2019-06-25 RX ORDER — METOCLOPRAMIDE HCL 10 MG
10 TABLET ORAL ONCE
Refills: 0 | Status: DISCONTINUED | OUTPATIENT
Start: 2019-06-25 | End: 2019-06-25

## 2019-06-25 RX ORDER — FENTANYL CITRATE 50 UG/ML
50 INJECTION INTRAVENOUS
Refills: 0 | Status: DISCONTINUED | OUTPATIENT
Start: 2019-06-25 | End: 2019-06-25

## 2019-06-25 RX ORDER — SODIUM CHLORIDE 9 MG/ML
1000 INJECTION, SOLUTION INTRAVENOUS
Refills: 0 | Status: DISCONTINUED | OUTPATIENT
Start: 2019-06-25 | End: 2019-06-25

## 2019-06-25 RX ORDER — FENTANYL CITRATE 50 UG/ML
25 INJECTION INTRAVENOUS
Refills: 0 | Status: DISCONTINUED | OUTPATIENT
Start: 2019-06-25 | End: 2019-06-25

## 2019-06-25 RX ORDER — TAMSULOSIN HYDROCHLORIDE 0.4 MG/1
1 CAPSULE ORAL
Qty: 0 | Refills: 0 | DISCHARGE

## 2019-06-25 RX ORDER — ONDANSETRON 8 MG/1
4 TABLET, FILM COATED ORAL ONCE
Refills: 0 | Status: DISCONTINUED | OUTPATIENT
Start: 2019-06-25 | End: 2019-06-25

## 2019-06-25 RX ORDER — METFORMIN HYDROCHLORIDE 850 MG/1
1 TABLET ORAL
Qty: 0 | Refills: 0 | DISCHARGE

## 2019-06-25 RX ORDER — ATENOLOL 25 MG/1
1 TABLET ORAL
Qty: 0 | Refills: 0 | DISCHARGE

## 2019-06-25 RX ORDER — LIRAGLUTIDE 6 MG/ML
1.8 INJECTION SUBCUTANEOUS
Qty: 0 | Refills: 0 | DISCHARGE

## 2019-06-25 RX ORDER — CANAGLIFLOZIN 100 MG/1
1 TABLET, FILM COATED ORAL
Qty: 0 | Refills: 0 | DISCHARGE

## 2019-06-25 NOTE — ASU DISCHARGE PLAN (ADULT/PEDIATRIC) - ASU DC SPECIAL INSTRUCTIONSFT
Please take ASA on daily basis as usual, Plavix to be resumed after 24hrs postoperatively. Please call the number provided to follow up with Dr. Blas via telephone within 1 week.

## 2019-06-25 NOTE — ASU DISCHARGE PLAN (ADULT/PEDIATRIC) - CALL YOUR DOCTOR IF YOU HAVE ANY OF THE FOLLOWING:
Fever greater than (need to indicate Fahrenheit or Celsius)/Pain not relieved by Medications/Swelling that gets worse/Bleeding that does not stop

## 2019-06-25 NOTE — BRIEF OPERATIVE NOTE - NSICDXBRIEFPROCEDURE_GEN_ALL_CORE_FT
PROCEDURES:  Bronchoscopy, flexible, adult 25-Jun-2019 15:27:58  Vega Mcdaniels  Mediastinoscopy 25-Jun-2019 15:27:42  Vega Mcdaniels

## 2019-06-25 NOTE — ASU DISCHARGE PLAN (ADULT/PEDIATRIC) - CARE PROVIDER_API CALL
Arnel Blas (MD)  Surgery; Thoracic Surgery  [address] [place] [address]  Phone: [phone]  Fax: [phone]  Follow Up Time: 1 week

## 2019-06-26 LAB
NIGHT BLUE STAIN TISS: SIGNIFICANT CHANGE UP
NIGHT BLUE STAIN TISS: SIGNIFICANT CHANGE UP
SPECIMEN SOURCE: SIGNIFICANT CHANGE UP
SPECIMEN SOURCE: SIGNIFICANT CHANGE UP

## 2019-06-27 ENCOUNTER — APPOINTMENT (OUTPATIENT)
Dept: CARDIOLOGY | Facility: CLINIC | Age: 74
End: 2019-06-27

## 2019-06-27 LAB
CULTURE RESULTS: SIGNIFICANT CHANGE UP
SPECIMEN SOURCE: SIGNIFICANT CHANGE UP

## 2019-06-30 LAB
CULTURE RESULTS: SIGNIFICANT CHANGE UP
SPECIMEN SOURCE: SIGNIFICANT CHANGE UP

## 2019-07-01 LAB — SURGICAL PATHOLOGY STUDY: SIGNIFICANT CHANGE UP

## 2019-07-16 LAB
CULTURE RESULTS: SIGNIFICANT CHANGE UP
CULTURE RESULTS: SIGNIFICANT CHANGE UP
SPECIMEN SOURCE: SIGNIFICANT CHANGE UP
SPECIMEN SOURCE: SIGNIFICANT CHANGE UP

## 2020-12-01 ENCOUNTER — EMERGENCY (EMERGENCY)
Facility: HOSPITAL | Age: 75
LOS: 1 days | Discharge: HOME | End: 2020-12-01
Attending: EMERGENCY MEDICINE
Payer: MEDICARE

## 2020-12-01 VITALS
DIASTOLIC BLOOD PRESSURE: 73 MMHG | SYSTOLIC BLOOD PRESSURE: 174 MMHG | WEIGHT: 240.08 LBS | HEART RATE: 72 BPM | RESPIRATION RATE: 20 BRPM | HEIGHT: 69 IN | TEMPERATURE: 98 F | OXYGEN SATURATION: 94 %

## 2020-12-01 DIAGNOSIS — Z90.89 ACQUIRED ABSENCE OF OTHER ORGANS: Chronic | ICD-10-CM

## 2020-12-01 DIAGNOSIS — Z98.890 OTHER SPECIFIED POSTPROCEDURAL STATES: Chronic | ICD-10-CM

## 2020-12-01 DIAGNOSIS — I25.10 ATHEROSCLEROTIC HEART DISEASE OF NATIVE CORONARY ARTERY WITHOUT ANGINA PECTORIS: Chronic | ICD-10-CM

## 2020-12-01 PROBLEM — I10 ESSENTIAL (PRIMARY) HYPERTENSION: Chronic | Status: ACTIVE | Noted: 2019-06-04

## 2020-12-01 PROBLEM — M10.9 GOUT, UNSPECIFIED: Chronic | Status: ACTIVE | Noted: 2019-06-04

## 2020-12-01 PROBLEM — E11.9 TYPE 2 DIABETES MELLITUS WITHOUT COMPLICATIONS: Chronic | Status: ACTIVE | Noted: 2019-06-04

## 2020-12-01 PROBLEM — N20.0 CALCULUS OF KIDNEY: Chronic | Status: ACTIVE | Noted: 2019-06-04

## 2020-12-01 PROBLEM — I65.29 OCCLUSION AND STENOSIS OF UNSPECIFIED CAROTID ARTERY: Chronic | Status: ACTIVE | Noted: 2019-06-04

## 2020-12-01 PROBLEM — N40.0 BENIGN PROSTATIC HYPERPLASIA WITHOUT LOWER URINARY TRACT SYMPTOMS: Chronic | Status: ACTIVE | Noted: 2019-06-04

## 2020-12-01 LAB
APPEARANCE UR: CLEAR — SIGNIFICANT CHANGE UP
BACTERIA # UR AUTO: ABNORMAL
BILIRUB UR-MCNC: NEGATIVE — SIGNIFICANT CHANGE UP
COLOR SPEC: YELLOW — SIGNIFICANT CHANGE UP
DIFF PNL FLD: ABNORMAL
EPI CELLS # UR: SIGNIFICANT CHANGE UP
GLUCOSE UR QL: 1000 MG/DL
KETONES UR-MCNC: NEGATIVE — SIGNIFICANT CHANGE UP
LEUKOCYTE ESTERASE UR-ACNC: NEGATIVE — SIGNIFICANT CHANGE UP
NITRITE UR-MCNC: NEGATIVE — SIGNIFICANT CHANGE UP
PH UR: 6 — SIGNIFICANT CHANGE UP (ref 5–8)
PROT UR-MCNC: 30 MG/DL
RBC CASTS # UR COMP ASSIST: ABNORMAL /HPF (ref 0–4)
SP GR SPEC: 1 — LOW (ref 1.01–1.02)
UROBILINOGEN FLD QL: NEGATIVE MG/DL — SIGNIFICANT CHANGE UP
WBC UR QL: SIGNIFICANT CHANGE UP

## 2020-12-01 PROCEDURE — 99283 EMERGENCY DEPT VISIT LOW MDM: CPT

## 2020-12-01 PROCEDURE — 81001 URINALYSIS AUTO W/SCOPE: CPT

## 2020-12-01 PROCEDURE — 87086 URINE CULTURE/COLONY COUNT: CPT

## 2020-12-01 NOTE — ED PROVIDER NOTE - ATTENDING CONTRIBUTION TO CARE
Melchor DAVENPORT- 75 Y/O M with hematuria, last year, sarcoidosis , kidney stones p/w pink bloody urine this morning. No fever, chills, abd pain or penile pain    Pt is alert, well appearing male, s1s2 normal reg, b/l clear breath sounds, abd soft, nt, nd, normal bowel sounds, no cvat b/l, neuro exam aox3, cn 2-12 intact, no focal deficits, skin warm, dry, good turgor    plan to check urine and urine cx

## 2020-12-01 NOTE — ED PROVIDER NOTE - OBJECTIVE STATEMENT
Patient is a 74 year old male who presents c/o hematuria. patient has hx of hematuria, did have work up in past found to kidney stone once. patient has not followed up with urology this year, but does have one in [place]. patient denies any abdominal or flank pain, no dysuria, no testicular pain, no discharge.  patient states had hematuria this am, has decreased in severity since

## 2020-12-01 NOTE — ED PROVIDER NOTE - FAMILY HISTORY
FHx: diabetes mellitus     Sibling  Still living? Unknown  FHx: hypertension, Age at diagnosis: Age Unknown  FHx: colon cancer, Age at diagnosis: Age Unknown  FH: heart disease, Age at diagnosis: Age Unknown     Mother  Still living? Unknown  FHx: hypertension, Age at diagnosis: Age Unknown  FH: breast cancer, Age at diagnosis: Age Unknown

## 2020-12-01 NOTE — ED ADULT TRIAGE NOTE - CHIEF COMPLAINT QUOTE
Pt. complaining of hematuria x 1 day.  Pt. denies any other complaints at this time.  Pt. with recent travel from [place] 1 week ago; negative covid pta to [place].

## 2020-12-01 NOTE — ED PROVIDER NOTE - PATIENT PORTAL LINK FT
You can access the FollowMyHealth Patient Portal offered by [facility] by registering at the following website: [email or web address]. By joining [employer]’s FollowMyHealth portal, you will also be able to view your health information using other applications (apps) compatible with our system.

## 2020-12-02 LAB
CULTURE RESULTS: SIGNIFICANT CHANGE UP
SPECIMEN SOURCE: SIGNIFICANT CHANGE UP

## 2021-01-18 NOTE — ED PROVIDER NOTE - DOMESTIC TRAVEL HIGH RISK ALERT 1
What Type Of Note Output Would You Prefer (Optional)?: Bullet Format How Severe Are Your Spot(S)?: mild Have Your Spot(S) Been Treated In The Past?: has not been treated Hpi Title: Evaluation of Skin Lesions Additional History: No concerns at today’s visit [place]

## 2021-06-17 NOTE — H&P PST ADULT - HISTORY OF PRESENT ILLNESS
No Gross hematuria and urine retention started end of march.  He was sent to a urology had  cystoscopy and then sent for  CT abd and pelvis .   CT abd and pelvis revealed - punctate nonobstructive right nephrolithiasisa   Enlarged mesenteric lymph nodes noted on abd pelvis CT, enlarged mediastinal lymph nodes   5/1/19 CT chest -right paratracheal lymphadenopathy malignancy not excluded. indeterminate 6 mm sclerotic lesion posterior right 11th rib. Mild cardiomegaly with coronary artery calcifications.  CT Neck right paratracheal pathological enlarged lymph nodes. high grade proximal left ICA stenosis   5/1/19 superior mesenteric root lymphadenopthy, largest hawa mass 3.2 x 4.7 cm, 73 year old male with h/o HTN, DM type II, carotid stenosis, CAD s/p 4 stents, gout and enlarge prostate present to PST with c/o enlarged lymph nodes noted on CT. Patient state in the end of march, he had a episode of gross hematuria and urine retention.   He was sent to a urology had cystoscopy and then on march 29/19 had a  CT abd and pelvis revealed - right renal cyst, diffuse prostatomegaly, punctate nonobstructive right nephrolithiasis, no ureteral obstructing.  Superior mesenteric root stranding with lymphoadenopathy, the largest hawa mass approx 3.2 x4.7 cm .    5/1/19 CT chest -right paratracheal lymphadenopathy malignancy not excluded. Indeterminate 6 mm sclerotic lesion posterior right 11th rib. Mild cardiomegaly with coronary artery calcifications.  CT Neck -right paratracheal pathological enlarged lymph nodes. high grade proximal left ICA stenosis.  Patient state hematuria and urinary retention have since resolved. Denies SOB, CP, fatigue or abd pain . He is now schedule for flexible bronchoscopy, mediastinoscopy. This is a 73 year old male with h/o HTN, DM type II, carotid stenosis, CAD s/p 4 stents, gout and enlarge prostate present to PST with c/o enlarged lymph nodes noted on CT. Patient state in the end of march, he had a episode of gross hematuria and urine retention.   He was sent to a urology had cystoscopy and then on march 29/19 had a  CT abd and pelvis revealed - right renal cyst, diffuse prostatomegaly, punctate nonobstructive right nephrolithiasis, no ureteral obstructing.  Superior mesenteric root stranding with lymphoadenopathy, the largest hawa mass approx 3.2 x4.7 cm .    5/1/19 CT chest -right paratracheal lymphadenopathy malignancy not excluded. Indeterminate 6 mm sclerotic lesion posterior right 11th rib. Mild cardiomegaly with coronary artery calcifications.  CT Neck -right paratracheal pathological enlarged lymph nodes. high grade proximal left ICA stenosis. 5/7/19 PET CT- increased uptake in mediastinal and abdominal adenopathy with SUVs in the 5-6 range consistent with neoplasm. This is likely lymphoma given the distribution. none of these lesions is amenable to image guided percutaneous biopsy.   Patient state hematuria and urinary retention have since resolved. Denies SOB, CP, fatigue or abd pain . He is now schedule for flexible bronchoscopy, mediastinoscopy.

## 2023-02-13 NOTE — ED ADULT TRIAGE NOTE - TEMPERATURE IN FAHRENHEIT (DEGREES F)
98.4 Ivermectin Counseling:  Patient instructed to take medication on an empty stomach with a full glass of water.  Patient informed of potential adverse effects including but not limited to nausea, diarrhea, dizziness, itching, and swelling of the extremities or lymph nodes.  The patient verbalized understanding of the proper use and possible adverse effects of ivermectin.  All of the patient's questions and concerns were addressed.